# Patient Record
Sex: MALE | URBAN - METROPOLITAN AREA
[De-identification: names, ages, dates, MRNs, and addresses within clinical notes are randomized per-mention and may not be internally consistent; named-entity substitution may affect disease eponyms.]

---

## 2018-06-07 ENCOUNTER — OFFICE VISIT (OUTPATIENT)
Dept: FAMILY MEDICINE | Facility: CLINIC | Age: 33
End: 2018-06-07
Payer: COMMERCIAL

## 2018-06-07 VITALS
HEART RATE: 69 BPM | HEIGHT: 64 IN | TEMPERATURE: 98.3 F | OXYGEN SATURATION: 97 % | SYSTOLIC BLOOD PRESSURE: 138 MMHG | BODY MASS INDEX: 26.79 KG/M2 | DIASTOLIC BLOOD PRESSURE: 88 MMHG | WEIGHT: 156.9 LBS

## 2018-06-07 DIAGNOSIS — J01.00 ACUTE NON-RECURRENT MAXILLARY SINUSITIS: Primary | ICD-10-CM

## 2018-06-07 DIAGNOSIS — R09.82 POST-NASAL DRAINAGE: ICD-10-CM

## 2018-06-07 PROCEDURE — 99203 OFFICE O/P NEW LOW 30 MIN: CPT | Performed by: PHYSICIAN ASSISTANT

## 2018-06-07 NOTE — MR AVS SNAPSHOT
"              After Visit Summary   6/7/2018    Niyah Montero    MRN: [id number]           Patient Information     Date Of Birth          1985        Visit Information        Provider Department      6/7/2018 12:00 PM Marc Luciano PA-C [facility]        Today's Diagnoses     Acute non-recurrent maxillary sinusitis    -  1    Post-nasal drainage           Follow-ups after your visit        Your next 10 appointments already scheduled     Jun 07, 2018 12:00 PM CDT   Office Visit with Marc Luciano PA-C   [facility] ([facility])    [address] [address] [phone]           Bring a current list of meds and any records pertaining to this visit. For Physicals, please bring immunization records and any forms needing to be filled out. Please arrive 10 minutes early to complete paperwork.              Who to contact     If you have questions or need follow up information about today's clinic visit or your schedule please contact [facility] directly at [phone].  Normal or non-critical lab and imaging results will be communicated to you by [employer], letter or phone within 4 business days after the clinic has received the results. If you do not hear from us within 7 days, please contact the clinic through [employer] or phone. If you have a critical or abnormal lab result, we will notify you by phone as soon as possible.  Submit refill requests through [facility] or call your pharmacy and they will forward the refill request to us. Please allow 3 business days for your refill to be completed.          Additional Information About Your Visit        MyChart Information     [facility] lets you send messages to your doctor, view your test results, renew your prescriptions, schedule appointments and more. To sign up, go to [email or web address] . Click on \"Log in\" on the left side of the screen, which will take you to the Welcome " "page. Then click on \"Sign up Now\" on the right side of the page.     You will be asked to enter the access code listed below, as well as some personal information. Please follow the directions to create your username and password.     Your access code is: [email or web address]  Expires: 2018 11:48 AM     Your access code will  in 90 days. If you need help or a new code, please call your [facility] or [phone].        Care EveryWhere ID     This is your Care EveryWhere ID. This could be used by other organizations to access your [place] medical records  [id number]        Your Vitals Were     Pulse Temperature Height Pulse Oximetry BMI (Body Mass Index)       69 98.3  F (36.8  C) (Oral) 5' 4\" (1.626 m) 97% 26.93 kg/m2        Blood Pressure from Last 3 Encounters:   18 138/88    Weight from Last 3 Encounters:   18 156 lb 14.4 oz (71.2 kg)              Today, you had the following     No orders found for display         Today's Medication Changes          These changes are accurate as of 18 11:48 AM.  If you have any questions, ask your nurse or doctor.               Start taking these medicines.        Dose/Directions    loratadine-pseudoePHEDrine  MG per 24 hr tablet   Commonly known as:  CLARITIN-D 24-hour   Used for:  Acute non-recurrent maxillary sinusitis, Post-nasal drainage   Started by:  Marc Luciano PA-C        Dose:  1 tablet   Take 1 tablet by mouth daily   Quantity:  7 tablet   Refills:  0            Where to get your medicines      Some of these will need a paper prescription and others can be bought over the counter.  Ask your nurse if you have questions.     Bring a paper prescription for each of these medications     loratadine-pseudoePHEDrine  MG per 24 hr tablet                Primary Care Provider Office Phone # Fax #    Marc Luciano PA-C [phone] [address] [address]        Equal Access " to Services     HELEN HOLLINGSWORTH : Machelle Pacheco, rimma witt, cheryl dhillon. So [facility] [phone].    ATENCIÓN: Si habla español, tiene a white disposición servicios gratuitos de asistencia lingüística. Llame al [phone].    We comply with applicable federal civil rights laws and [place] laws. We do not discriminate on the basis of race, color, national origin, age, disability, sex, sexual orientation, or gender identity.            Thank you!     Thank you for choosing [facility]  for your care. Our goal is always to provide you with excellent care. Hearing back from our patients is one way we can continue to improve our services. Please take a few minutes to complete the written survey that you may receive in the mail after your visit with us. Thank you!             Your Updated Medication List - Protect others around you: Learn how to safely use, store and throw away your medicines at www.disposemymeds.org.          This list is accurate as of 6/7/18 11:48 AM.  Always use your most recent med list.                   Brand Name Dispense Instructions for use Diagnosis    loratadine-pseudoePHEDrine  MG per 24 hr tablet    CLARITIN-D 24-hour    7 tablet    Take 1 tablet by mouth daily    Acute non-recurrent maxillary sinusitis, Post-nasal drainage

## 2018-06-07 NOTE — LETTER
[facility]  [address]  [facility] [address]  Phone: [phone]    June 7, 2018        Niyah Montero  [address]  [facility] [address]          To whom it may concern:    RE: Niyah Montero    Patient was seen and treated today at our clinic and missed work.    Please contact me for questions or concerns.      Sincerely,        Marc Luciano PA-C

## 2018-06-07 NOTE — PROGRESS NOTES
"  SUBJECTIVE:   Niyah Montero is a 32 year old male who presents to clinic today for the following health issues:      RESPIRATORY SYMPTOMS      Duration: x2 days     Description  nasal congestion, chills, headache, fatigue/malaise, hoarse voice and diarrhea    Severity: mild    Accompanying signs and symptoms: metal taste in mouth     History (predisposing factors):  none    Precipitating or alleviating factors: None    Therapies tried and outcome:  Ibuprofen and tylenol - helps with sx temporarily           Problem list and histories reviewed & adjusted, as indicated.  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  Admits to the above symptoms with nasal drainage being the worst. Started with cold like symptoms earlier and over the last couple days been getting a poor taste when he swallows.  No ST.      BP Readings from Last 3 Encounters:   06/07/18 138/88    Wt Readings from Last 3 Encounters:   06/07/18 156 lb 14.4 oz (71.2 kg)                    Reviewed and updated as needed this visit by clinical staff       Reviewed and updated as needed this visit by Provider         ROS:  Constitutional, HEENT, cardiovascular, pulmonary, gi and gu systems are negative, except as otherwise noted.    OBJECTIVE:     /88  Pulse 69  Temp 98.3  F (36.8  C) (Oral)  Ht 5' 4\" (1.626 m)  Wt 156 lb 14.4 oz (71.2 kg)  SpO2 97%  BMI 26.93 kg/m2  Body mass index is 26.93 kg/(m^2).  GENERAL: alert and no distress  EYES: Eyes grossly normal to inspection  HENT: normal cephalic/atraumatic, ear canals and TM's normal, nasal mucosa edematous , rhinorrhea yellow, oropharynx clear and oral mucous membranes moist  RESP: lungs clear to auscultation - no rales, rhonchi or wheezes  CV: regular rate and rhythm, normal S1 S2, no S3 or S4, no murmur, click or rub, no peripheral edema and peripheral pulses strong  PSYCH: mentation appears normal, affect normal/bright    Diagnostic Test Results:  none "     ASSESSMENT/PLAN:             1. Acute non-recurrent maxillary sinusitis  Most likely viral.  Supportive care.  - loratadine-pseudoePHEDrine (CLARITIN-D 24-HOUR)  MG per 24 hr tablet; Take 1 tablet by mouth daily  Dispense: 7 tablet; Refill: 0    2. Post-nasal drainage    - loratadine-pseudoePHEDrine (CLARITIN-D 24-HOUR)  MG per 24 hr tablet; Take 1 tablet by mouth daily  Dispense: 7 tablet; Refill: 0    Follow up if symptoms persist or worsen     Marc Luciano PA-C  [facility]

## 2018-06-07 NOTE — NURSING NOTE
"Chief Complaint   Patient presents with     URI     /88  Pulse 69  Temp 98.3  F (36.8  C) (Oral)  Ht 5' 4\" (1.626 m)  Wt 156 lb 14.4 oz (71.2 kg)  SpO2 97%  BMI 26.93 kg/m2 Estimated body mass index is 26.93 kg/(m^2) as calculated from the following:    Height as of this encounter: 5' 4\" (1.626 m).    Weight as of this encounter: 156 lb 14.4 oz (71.2 kg).  Medication Reconciliation: complete      Health Maintenance that is potentially due pending provider review:  NONE    n/a    DARRELL Mayo  "